# Patient Record
(demographics unavailable — no encounter records)

---

## 2025-07-09 NOTE — PHYSICAL EXAM
[Version 4] : Word list version 4 - [place], [place], [place] [Abnormal Clock Drawn or Refused to Draw Clock (0 points)] : abnormal clock drawn or refused to draw clock (0 points) [Alert] : alert [No Acute Distress] : in no acute distress [Normal Outer Ear/Nose] : the ears and nose were normal in appearance [Normal Appearance] : the appearance of the neck was normal [Supple] : the neck was supple [No Respiratory Distress] : no respiratory distress [No Acc Muscle Use] : no accessory muscle use [Respiration, Rhythm And Depth] : normal respiratory rhythm and effort [Auscultation Breath Sounds / Voice Sounds] : lungs were clear to auscultation bilaterally [Heart Rate And Rhythm] : heart rate was normal and rhythm regular [Bowel Sounds] : normal bowel sounds [Abdomen Tenderness] : non-tender [Abdomen Soft] : soft [No Spinal Tenderness] : no spinal tenderness [Normal Color / Pigmentation] : normal skin color and pigmentation [Normal Turgor] : normal skin turgor [No Focal Deficits] : no focal deficits [Normal Affect] : the affect was normal [Normal Mood] : the mood was normal [Normal Gait] : abnormal gait [de-identified] : Ambulates with RW, timed up and go test greater than 12 seconds

## 2025-07-09 NOTE — ASSESSMENT
[FreeTextEntry1] : RTC in 1 month for cognitive evaluation.  Total time spent: 76 mins This includes chart review, patient assessment, discussion and collaboration with interdisciplinary team members, excluding time spent on separately billable services.

## 2025-07-09 NOTE — PHYSICAL EXAM
[Version 4] : Word list version 4 - [place], [place], [place] [Abnormal Clock Drawn or Refused to Draw Clock (0 points)] : abnormal clock drawn or refused to draw clock (0 points) [Alert] : alert [No Acute Distress] : in no acute distress [Normal Outer Ear/Nose] : the ears and nose were normal in appearance [Normal Appearance] : the appearance of the neck was normal [Supple] : the neck was supple [No Respiratory Distress] : no respiratory distress [No Acc Muscle Use] : no accessory muscle use [Respiration, Rhythm And Depth] : normal respiratory rhythm and effort [Auscultation Breath Sounds / Voice Sounds] : lungs were clear to auscultation bilaterally [Heart Rate And Rhythm] : heart rate was normal and rhythm regular [Bowel Sounds] : normal bowel sounds [Abdomen Tenderness] : non-tender [Abdomen Soft] : soft [No Spinal Tenderness] : no spinal tenderness [Normal Color / Pigmentation] : normal skin color and pigmentation [Normal Turgor] : normal skin turgor [No Focal Deficits] : no focal deficits [Normal Affect] : the affect was normal [Normal Mood] : the mood was normal [Normal Gait] : abnormal gait [de-identified] : Ambulates with RW, timed up and go test greater than 12 seconds

## 2025-07-09 NOTE — HISTORY OF PRESENT ILLNESS
[Patient reported hearing was abnormal] : Patient reported hearing was abnormal [Patient reported vision is abnormal] : Patient reported vision is abnormal [Patient reported Patient reported dental screening is normal] : Patient reported dental screening is normal [Patient reported colon/rectal/cancer screening was normal] : Patient reported colon/rectal cancer screening was normal [Patient reported skin cancer screening was normal] : Patient reported skin cancer screening was normal [Patient reported breast cancer screening was normal] : Patient reported breast cancer screening was normal [Patient reported cervical cancer screening was normal] : Patient reported cervical cancer screening was normal [One fall no injury in past year] : Patient reported one fall in the past year without injury [Completely Independent] : Completely independent. [Patient is independent with] : bathing [Independent] : managing finances [] : Assistance needed with traveling/transport [Cane] : cane [Walker] : walker [Wheelchair] : wheelchair [Other: ____________] : [unfilled] [Smoke Detector] : smoke detector [Carbon Monoxide Detector] : carbon monoxide detector [Shower Chair] : shower chair [Night Light] : night light [Wears Seat Belt] : wears seat belt [NO] : No [Little interest or pleasure doing things] : 1) Little interest or pleasure doing things [Feeling down, depressed, or hopeless] : 2) Feeling down, depressed, or hopeless [0] : 2) Feeling down, depressed, or hopeless: Not at all (0) [PHQ-2 Negative - No further assessment needed] : PHQ-2 Negative - No further assessment needed [I have developed a follow-up plan documented below in the note.] : I have developed a follow-up plan documented below in the note. [With Patient/Caregiver] : , with patient/caregiver [Reviewed updated] : Reviewed, updated [Designated Healthcare Proxy] : Designated healthcare proxy [Name: ___] : Health Care Proxy's Name: [unfilled]  [Relationship: ___] : Relationship: [unfilled] [I will adhere to the patient's wishes.] : I will adhere to the patient's wishes. [Time Spent: ___ minutes] : Time Spent: [unfilled] minutes [FAST Score: ____] : Functional Assessment Scale (FAST) Score: [unfilled] [FreeTextEntry1] : 85 y/o F with PMHx of HTN, HLD, R hip fx s/p ORIF, R knee OA s/p TKR, hypothyroidism, dementia, gait instability, urge incontinence, uterine fibroids s/p hysterectomy, R renal mass s/p partial nephrectomy? presents to the practice to establish care.  Accompanied by daughter-in-law, Clarissa.  MARIELOS: Was admitted to [facility] on 04/21/25 and discharged to [facility] in [facility] on 04/23/25, was discharged home from [facility] 2 months later. She was admitted to the hospital for mechanical fall with R hip fx, underwent orthopedic intervention for ORIF with skye placement. She was managed on pain control and ASA following operation. No post-operative complications noted. Was evaluated by PT in the hospital and was recommended discharge to [facility].  Following hospitalization and [facility] course, patient has lost almost 20 lbs and progression of memory changes. Daughter-in-law notes that her short-term memory is not as sharp as it used to be and is worse than before hospitalization. Her weight loss is due to decreased appetite. She is experiencing normal BM's, urinating w/o issues, sleeping well. Pain is well controlled, she is able to bear weight on her RLE though she is uncomfortable, ambulating with RW though she has a cane and wheelchair at home, family wants to change her RW to a rollator, PT working on ordering. Not following with ortho. She has ongoing home PT.  ~~~~~~  Dementia: Onset since about 6-8 months. 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 She has been experiencing short-term memory issues and brain fog. This has been exacerbated following recent hospitalization for R hip fx and ORIF. She has associated weight loss due to decreased appetite and slightly withdrawn behavior. She is otherwise sleeping well and denies depression/anxiety sx, hallucinations/delusions, agitation/aggression. Course also c/b mechanical falls and gait instability for which she uses a RW. She lives alone but spends most of her time at her daughter-in-law's home and socializes with her family, grandchildren, great grandchildren. No CV history, blood clots, DM, cancer history, head trauma. Was recently started on donepezil 5 mg daily and mirtazapine 7.5 mg daily by her primary care but she has not taken them.  HTN: Was on losartan but is not taking anymore. No cardiac, CVA history otherwise. BP today is within normal limits.  Denies chest pain, palpitations, SOB.  HLD: Was on pravastatin but not taking anymore.  Hypothyroidism: Not on any medications. No nodules or thyroid cancer noted.  R knee OA: R knee surgery around 10 years ago done at [facility], full knee replacement, feels like there is some pain/pressure but she is used to it. Ambulates with a RW.  SNHL: Daughter-in-law notes that her hearing has progressively worsened in the past year. Patient feels she has no issues. Open to ENT referral for evaluation.  Presbyopia: Glasses for distance. No cataracts, glaucoma, macular degeneration. Follows with eye doctor regularly.  Urge incontinence: Feels she needs to run to the bathroom frequently, no blood or stones in the urine, infrequent UTI's. Does not follow with any specialists or gyn. Uninterested in seeing specialists at this time.  HCM: Last colonoscopy greater than 10 years ago, was told she has no polyps, there is no family history of colon cancer. Last mammogram was greater than 10 years ago, no history of breast masses/nodules, no family history of breast cancer. Last pap smear was greater than 10 years ago, s/p hysterectomy many years ago, unsure if cervix was removed. No h/o abnormal pap smears, no family history of gyn cancers. Unsure when last dexa scan was done, open to doing one. No skin cancer history. Sees dermatologist every year for mole check. No smoking and drinking history. Last PCV20 and shingrix shot was about 2 years ago in the fall. Gets flu shot every year. Got COVID shot last year. [Stair Lift] : no stair lift used in home [Grab Bars] : no grab bars [Anti-Slip Measures] : no anti-slip measures [Driving Concerns] : not driving or driving without noted concerns [Wears Sunscreen] : does not wear sunscreen [FreeTextEntry6] : Does not driving, felt she was not focusing while she was driving [[id number]] : 0 [AdvancecareDate] : 07/08/2025 [FreeTextEntry4] : Clarissa Brady is her DIL and HCP, HCP form filled out today.  MOLST form reviewed and provided to patient to discuss with her family.

## 2025-07-09 NOTE — HISTORY OF PRESENT ILLNESS
[Patient reported hearing was abnormal] : Patient reported hearing was abnormal [Patient reported vision is abnormal] : Patient reported vision is abnormal [Patient reported Patient reported dental screening is normal] : Patient reported dental screening is normal [Patient reported colon/rectal/cancer screening was normal] : Patient reported colon/rectal cancer screening was normal [Patient reported skin cancer screening was normal] : Patient reported skin cancer screening was normal [Patient reported breast cancer screening was normal] : Patient reported breast cancer screening was normal [Patient reported cervical cancer screening was normal] : Patient reported cervical cancer screening was normal [One fall no injury in past year] : Patient reported one fall in the past year without injury [Completely Independent] : Completely independent. [Patient is independent with] : bathing [Independent] : managing finances [] : Assistance needed with traveling/transport [Cane] : cane [Walker] : walker [Wheelchair] : wheelchair [Other: ____________] : [unfilled] [Smoke Detector] : smoke detector [Carbon Monoxide Detector] : carbon monoxide detector [Shower Chair] : shower chair [Night Light] : night light [Wears Seat Belt] : wears seat belt [NO] : No [Little interest or pleasure doing things] : 1) Little interest or pleasure doing things [Feeling down, depressed, or hopeless] : 2) Feeling down, depressed, or hopeless [0] : 2) Feeling down, depressed, or hopeless: Not at all (0) [PHQ-2 Negative - No further assessment needed] : PHQ-2 Negative - No further assessment needed [I have developed a follow-up plan documented below in the note.] : I have developed a follow-up plan documented below in the note. [With Patient/Caregiver] : , with patient/caregiver [Reviewed updated] : Reviewed, updated [Designated Healthcare Proxy] : Designated healthcare proxy [Name: ___] : Health Care Proxy's Name: [unfilled]  [Relationship: ___] : Relationship: [unfilled] [I will adhere to the patient's wishes.] : I will adhere to the patient's wishes. [Time Spent: ___ minutes] : Time Spent: [unfilled] minutes [FAST Score: ____] : Functional Assessment Scale (FAST) Score: [unfilled] [FreeTextEntry1] : 85 y/o F with PMHx of HTN, HLD, R hip fx s/p ORIF, R knee OA s/p TKR, hypothyroidism, dementia, gait instability, urge incontinence, uterine fibroids s/p hysterectomy, R renal mass s/p partial nephrectomy? presents to the practice to establish care.  Accompanied by daughter-in-law, Clarissa.  MARIELOS: Was admitted to [facility] on 04/21/25 and discharged to [facility] in [place] on 04/23/25, was discharged home from [facility] 2 months later. She was admitted to the hospital for mechanical fall with R hip fx, underwent orthopedic intervention for ORIF with skye placement. She was managed on pain control and ASA following operation. No post-operative complications noted. Was evaluated by PT in the hospital and was recommended discharge to [facility].  Following hospitalization and [facility] course, patient has lost almost 20 lbs and progression of memory changes. Daughter-in-law notes that her short-term memory is not as sharp as it used to be and is worse than before hospitalization. Her weight loss is due to decreased appetite. She is experiencing normal BM's, urinating w/o issues, sleeping well. Pain is well controlled, she is able to bear weight on her RLE though she is uncomfortable, ambulating with RW though she has a cane and wheelchair at home, family wants to change her RW to a rollator, PT working on ordering. Not following with ortho. She has ongoing home PT.  ~~~~~~  Dementia: Onset since about 6-8 months. 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 She has been experiencing short-term memory issues and brain fog. This has been exacerbated following recent hospitalization for R hip fx and ORIF. She has associated weight loss due to decreased appetite and slightly withdrawn behavior. She is otherwise sleeping well and denies depression/anxiety sx, hallucinations/delusions, agitation/aggression. Course also c/b mechanical falls and gait instability for which she uses a RW. She lives alone but spends most of her time at her daughter-in-law's home and socializes with her family, grandchildren, great grandchildren. No CV history, blood clots, DM, cancer history, head trauma. Was recently started on donepezil 5 mg daily and mirtazapine 7.5 mg daily by her primary care but she has not taken them.  HTN: Was on losartan but is not taking anymore. No cardiac, CVA history otherwise. BP today is within normal limits.  Denies chest pain, palpitations, SOB.  HLD: Was on pravastatin but not taking anymore.  Hypothyroidism: Not on any medications. No nodules or thyroid cancer noted.  R knee OA: R knee surgery around 10 years ago done at [facility], full knee replacement, feels like there is some pain/pressure but she is used to it. Ambulates with a RW.  SNHL: Daughter-in-law notes that her hearing has progressively worsened in the past year. Patient feels she has no issues. Open to ENT referral for evaluation.  Presbyopia: Glasses for distance. No cataracts, glaucoma, macular degeneration. Follows with eye doctor regularly.  Urge incontinence: Feels she needs to run to the bathroom frequently, no blood or stones in the urine, infrequent UTI's. Does not follow with any specialists or gyn. Uninterested in seeing specialists at this time.  HCM: Last colonoscopy greater than 10 years ago, was told she has no polyps, there is no family history of colon cancer. Last mammogram was greater than 10 years ago, no history of breast masses/nodules, no family history of breast cancer. Last pap smear was greater than 10 years ago, s/p hysterectomy many years ago, unsure if cervix was removed. No h/o abnormal pap smears, no family history of gyn cancers. Unsure when last dexa scan was done, open to doing one. No skin cancer history. Sees dermatologist every year for mole check. No smoking and drinking history. Last PCV20 and shingrix shot was about 2 years ago in the fall. Gets flu shot every year. Got COVID shot last year. [Stair Lift] : no stair lift used in home [Grab Bars] : no grab bars [Anti-Slip Measures] : no anti-slip measures [Driving Concerns] : not driving or driving without noted concerns [Wears Sunscreen] : does not wear sunscreen [FreeTextEntry6] : Does not driving, felt she was not focusing while she was driving [[id number]] : 0 [AdvancecareDate] : 07/08/2025 [FreeTextEntry4] : Clarissa Brady is her DIL and HCP, HCP form filled out today.  MOLST form reviewed and provided to patient to discuss with her family.